# Patient Record
Sex: MALE | Race: BLACK OR AFRICAN AMERICAN | URBAN - METROPOLITAN AREA
[De-identification: names, ages, dates, MRNs, and addresses within clinical notes are randomized per-mention and may not be internally consistent; named-entity substitution may affect disease eponyms.]

---

## 2020-07-25 ENCOUNTER — TELEPHONE ENCOUNTER (OUTPATIENT)
Dept: URBAN - METROPOLITAN AREA CLINIC 13 | Facility: CLINIC | Age: 35
End: 2020-07-25

## 2020-07-25 RX ORDER — DICYCLOMINE HYDROCHLORIDE 20 MG/1
TAKE 1 CAPSULE AS NEEDED TABLET ORAL
Refills: 0 | OUTPATIENT
Start: 2010-06-08 | End: 2010-10-13

## 2020-07-25 RX ORDER — LORAZEPAM 2 MG/1
TAKE  TABLET  1/2 TABLET DAILY TABLET ORAL
Refills: 0 | OUTPATIENT
Start: 2009-02-02 | End: 2010-06-08

## 2020-07-25 RX ORDER — ESCITALOPRAM OXALATE 5 MG
TAKE 1 TABLET DAILY TABLET ORAL
Refills: 0 | OUTPATIENT
Start: 2009-02-02 | End: 2010-06-08

## 2020-07-26 ENCOUNTER — TELEPHONE ENCOUNTER (OUTPATIENT)
Dept: URBAN - METROPOLITAN AREA CLINIC 13 | Facility: CLINIC | Age: 35
End: 2020-07-26

## 2021-05-11 ENCOUNTER — TELEPHONE ENCOUNTER (OUTPATIENT)
Dept: URBAN - METROPOLITAN AREA CLINIC 72 | Facility: CLINIC | Age: 36
End: 2021-05-11

## 2021-06-01 ENCOUNTER — OFFICE VISIT (OUTPATIENT)
Dept: URBAN - METROPOLITAN AREA CLINIC 72 | Facility: CLINIC | Age: 36
End: 2021-06-01
Payer: SELF-PAY

## 2021-06-01 ENCOUNTER — WEB ENCOUNTER (OUTPATIENT)
Dept: URBAN - METROPOLITAN AREA CLINIC 72 | Facility: CLINIC | Age: 36
End: 2021-06-01

## 2021-06-01 VITALS
TEMPERATURE: 96.3 F | WEIGHT: 151 LBS | HEIGHT: 66 IN | BODY MASS INDEX: 24.27 KG/M2 | SYSTOLIC BLOOD PRESSURE: 121 MMHG | DIASTOLIC BLOOD PRESSURE: 86 MMHG | RESPIRATION RATE: 20 BRPM | HEART RATE: 76 BPM

## 2021-06-01 DIAGNOSIS — K21.9 GASTROESOPHAGEAL REFLUX DISEASE, UNSPECIFIED WHETHER ESOPHAGITIS PRESENT: ICD-10-CM

## 2021-06-01 DIAGNOSIS — K62.5 BRBPR (BRIGHT RED BLOOD PER RECTUM): ICD-10-CM

## 2021-06-01 PROCEDURE — 99204 OFFICE O/P NEW MOD 45 MIN: CPT | Performed by: INTERNAL MEDICINE

## 2021-06-01 RX ORDER — SUCRALFATE 1 G/1
AS DIRECTED TABLET ORAL ONCE DAILY
Status: ACTIVE | COMMUNITY

## 2021-06-01 RX ORDER — BENZTROPINE MESYLATE 0.5 MG/1
1 TABLET AT BEDTIME TABLET ORAL ONCE A DAY
Status: ACTIVE | COMMUNITY

## 2021-06-01 RX ORDER — PROPRANOLOL HYDROCHLORIDE 20 MG/1
1 TABLET TABLET ORAL ONCE A DAY
Status: ACTIVE | COMMUNITY

## 2021-06-01 RX ORDER — DULOXETINE 30 MG/1
1 CAPSULE CAPSULE, DELAYED RELEASE PELLETS ORAL ONCE A DAY
Status: ACTIVE | COMMUNITY

## 2021-06-01 RX ORDER — ARIPIPRAZOLE 10 MG/1
1 TABLET TABLET ORAL ONCE A DAY
Status: ACTIVE | COMMUNITY

## 2021-06-01 RX ORDER — ESOMEPRAZOLE MAGNESIUM 20 MG/1
1 CAPSULE CAPSULE, DELAYED RELEASE ORAL ONCE A DAY
Status: ACTIVE | COMMUNITY

## 2021-06-01 NOTE — HPI-TODAY'S VISIT:
Mister Barrett is a pleasant 36-year-old male who presents with a new patient for evaluation of abdominal discomfort. He was seen in the emergency department at [facility] on 5/18/2021 with a complaint of feeling as something was stuck in his esophagus, denied any true dysphagia.  He was treated with a GI cocktail which resulted issues.  he reports that he gets this pain quite often, it seems to come around at least once or twice a year.  He describes it as a pain in his left upper quadrant, is associated with burning in his midesophagus and occasional dysphagia.  He does take Nexium with some improvement overall however never completely resolved.  In addition he reports weight loss related to the pain as he has to cut back on what he eats due to discomfort.  He in addition endorses rectal bleeding, he states that he will have significant blood with a bowel movement that will then go away for a few days and then returned.  He has had hemorrhoids in the past but does not feel like it is related to that.  The pain and the bleeding can occur together can occur independently as well.  He has tried Metamucil for his bowels with some improvement overall.  He denies any overt constipation.  Chart review: He has been seen in our office before, last in 2010.  He initially presented to us with a complaint of abdominal pain and weight loss, GI workup including CT scan and lab workup CBC CMP and TSH as well as stool studies were unremarkable.  An EGD at that time showed erosive gastropathy and inflammation at the GE junction consistent with reflux, colonoscopy for hematochezia did reveal internal hemorrhoids.

## 2021-07-16 ENCOUNTER — OFFICE VISIT (OUTPATIENT)
Dept: URBAN - METROPOLITAN AREA MEDICAL CENTER 40 | Facility: MEDICAL CENTER | Age: 36
End: 2021-07-16

## 2021-07-27 ENCOUNTER — OFFICE VISIT (OUTPATIENT)
Dept: URBAN - METROPOLITAN AREA CLINIC 72 | Facility: CLINIC | Age: 36
End: 2021-07-27

## 2021-07-27 RX ORDER — DULOXETINE 30 MG/1
1 CAPSULE CAPSULE, DELAYED RELEASE PELLETS ORAL ONCE A DAY
Status: ACTIVE | COMMUNITY

## 2021-07-27 RX ORDER — ESOMEPRAZOLE MAGNESIUM 20 MG/1
1 CAPSULE CAPSULE, DELAYED RELEASE ORAL ONCE A DAY
Status: ACTIVE | COMMUNITY

## 2021-07-27 RX ORDER — PROPRANOLOL HYDROCHLORIDE 20 MG/1
1 TABLET TABLET ORAL ONCE A DAY
Status: ACTIVE | COMMUNITY

## 2021-07-27 RX ORDER — ARIPIPRAZOLE 10 MG/1
1 TABLET TABLET ORAL ONCE A DAY
Status: ACTIVE | COMMUNITY

## 2021-07-27 RX ORDER — SUCRALFATE 1 G/1
AS DIRECTED TABLET ORAL ONCE DAILY
Status: ACTIVE | COMMUNITY

## 2021-07-27 RX ORDER — BENZTROPINE MESYLATE 0.5 MG/1
1 TABLET AT BEDTIME TABLET ORAL ONCE A DAY
Status: ACTIVE | COMMUNITY

## 2021-07-27 NOTE — HPI-TODAY'S VISIT:
Mr. Barrett returns for follow-up.  He is a pleasant 36-year-old male last seen office on 6/1/2021.  He was referred to us from the ER after experiencing a foreign body sensation in esophagus that responded to GI cocktail.  He stated that resolved and he actually had occasional burning in his midesophagus and left upper quadrant pain.  He noted weight loss and decreased appetite as well as rectal bleeding.  He tried Metamucil for the rectal bleeding with minimal improvement.  He had similar complaints back in 2010 and had a complete workup including CT scan, blood work and EGD and colonoscopy.  EGD showed reflux and colonoscopy had internal hemorrhoids.  Due to his ongoing symptoms we decided to proceed again with bidirectional endoscopy her patient never had this performed.

## 2023-03-24 ENCOUNTER — CLAIMS CREATED FROM THE CLAIM WINDOW (OUTPATIENT)
Dept: URBAN - METROPOLITAN AREA CLINIC 72 | Facility: CLINIC | Age: 38
End: 2023-03-24
Payer: SELF-PAY

## 2023-03-24 ENCOUNTER — LAB OUTSIDE AN ENCOUNTER (OUTPATIENT)
Dept: URBAN - METROPOLITAN AREA CLINIC 72 | Facility: CLINIC | Age: 38
End: 2023-03-24

## 2023-03-24 VITALS
DIASTOLIC BLOOD PRESSURE: 78 MMHG | WEIGHT: 165 LBS | BODY MASS INDEX: 26.52 KG/M2 | SYSTOLIC BLOOD PRESSURE: 118 MMHG | HEART RATE: 81 BPM | HEIGHT: 66 IN

## 2023-03-24 DIAGNOSIS — R19.8 CHANGE IN BOWEL MOVEMENT: ICD-10-CM

## 2023-03-24 DIAGNOSIS — K21.00 GASTROESOPHAGEAL REFLUX DISEASE WITH ESOPHAGITIS WITHOUT HEMORRHAGE: ICD-10-CM

## 2023-03-24 DIAGNOSIS — K64.8 INTERNAL HEMORRHOIDS: ICD-10-CM

## 2023-03-24 DIAGNOSIS — R39.15 URINARY URGENCY: ICD-10-CM

## 2023-03-24 PROBLEM — 266433003: Status: ACTIVE | Noted: 2023-03-24

## 2023-03-24 PROCEDURE — 99214 OFFICE O/P EST MOD 30 MIN: CPT | Performed by: NURSE PRACTITIONER

## 2023-03-24 RX ORDER — DULOXETINE 30 MG/1
1 CAPSULE CAPSULE, DELAYED RELEASE PELLETS ORAL ONCE A DAY
Status: ACTIVE | COMMUNITY

## 2023-03-24 RX ORDER — SUCRALFATE 1 G/1
AS DIRECTED TABLET ORAL ONCE DAILY
Status: ON HOLD | COMMUNITY

## 2023-03-24 RX ORDER — ARIPIPRAZOLE 10 MG/1
1 TABLET TABLET ORAL ONCE A DAY
Status: ACTIVE | COMMUNITY

## 2023-03-24 RX ORDER — ESOMEPRAZOLE MAGNESIUM 20 MG/1
1 CAPSULE CAPSULE, DELAYED RELEASE ORAL ONCE A DAY
Status: ACTIVE | COMMUNITY

## 2023-03-24 RX ORDER — PROPRANOLOL HYDROCHLORIDE 20 MG/1
1 TABLET TABLET ORAL ONCE A DAY
Status: ACTIVE | COMMUNITY

## 2023-03-24 RX ORDER — BENZTROPINE MESYLATE 0.5 MG/1
1 TABLET AT BEDTIME TABLET ORAL ONCE A DAY
Status: ACTIVE | COMMUNITY

## 2023-03-24 NOTE — HPI-TODAY'S VISIT:
38-year-old male here for hemorrhoids.  Past medical history of anxiety, GERD, IBS and schizophrenia.  Was seen in the ER 3/22/2023 with pain when having bowel movement and trouble urinating.  UA negative.  Was prescribed docusate.  UA 3/22/2023.  Negative.  Had scrotal ultrasound 12/4/2022 4 mm left testicular cyst  Last seen 6/1/2021 for abdominal discomfort, GERD and bright red blood per rectum.  Colonoscopy and EGD ordered for further evaluation.  Procedures were canceled and he no showed for his follow-up appointment.  Last EGD/Colon 2009/2010 and had a complete workup including CT scan, blood work and EGD and colonoscopy.  EGD showed reflux and colonoscopy had internal hemorrhoids.  Pt [facility] due to flare up off hemorrhoids and feeling like he isnt fully emptying bladder. Was suppose to follow up with Gastro and Urology.   Pt has to see PCP for referral to urology has a appt. Monday. Pt states he has pain and bleeding with hemorrhoids. He has been using Preparation H suppositories for a week. He also has pain with bowels and while sitting down.  Pt was told at ER he had external and internal hemorrhoids. Was told by ER to just do fiber. Pt has had hemorrhoids for years never had removed.  BM daily loose.  Abdominal pain after BM.  No melena or hematochezia.   Intermittent GERD for years.  On esomeprazole daily.  No CP, SOB, palpitations, syncope, near-syncope or problems with anesthesia previously.

## 2023-03-24 NOTE — EXAM-PHYSICAL EXAM
Rectal/Anoscopy: Normal external exam.  Small internall hemorrhoid.  No bleeding present.  PAGE: Normal sphincter tone and rectal muscle upon bearing down. No tenderness on exam.  Chaperone present.

## 2023-03-27 ENCOUNTER — TELEPHONE ENCOUNTER (OUTPATIENT)
Dept: URBAN - METROPOLITAN AREA CLINIC 72 | Facility: CLINIC | Age: 38
End: 2023-03-27

## 2023-04-04 ENCOUNTER — TELEPHONE ENCOUNTER (OUTPATIENT)
Dept: URBAN - METROPOLITAN AREA CLINIC 72 | Facility: CLINIC | Age: 38
End: 2023-04-04

## 2023-04-06 ENCOUNTER — TELEPHONE ENCOUNTER (OUTPATIENT)
Dept: URBAN - METROPOLITAN AREA CLINIC 72 | Facility: CLINIC | Age: 38
End: 2023-04-06

## 2023-04-14 ENCOUNTER — OFFICE VISIT (OUTPATIENT)
Dept: URBAN - METROPOLITAN AREA MEDICAL CENTER 40 | Facility: MEDICAL CENTER | Age: 38
End: 2023-04-14

## 2023-04-19 ENCOUNTER — TELEPHONE ENCOUNTER (OUTPATIENT)
Dept: URBAN - METROPOLITAN AREA CLINIC 92 | Facility: CLINIC | Age: 38
End: 2023-04-19

## 2023-04-24 ENCOUNTER — OFFICE VISIT (OUTPATIENT)
Dept: URBAN - METROPOLITAN AREA MEDICAL CENTER 40 | Facility: MEDICAL CENTER | Age: 38
End: 2023-04-24

## 2023-05-04 ENCOUNTER — OFFICE VISIT (OUTPATIENT)
Dept: URBAN - METROPOLITAN AREA MEDICAL CENTER 40 | Facility: MEDICAL CENTER | Age: 38
End: 2023-05-04

## 2023-05-19 ENCOUNTER — CLAIMS CREATED FROM THE CLAIM WINDOW (OUTPATIENT)
Dept: URBAN - METROPOLITAN AREA MEDICAL CENTER 40 | Facility: MEDICAL CENTER | Age: 38
End: 2023-05-19
Payer: SELF-PAY

## 2023-05-19 DIAGNOSIS — K31.89 REACTIVE GASTROPATHY: ICD-10-CM

## 2023-05-19 DIAGNOSIS — K22.10 EROSIVE ESOPHAGITIS: ICD-10-CM

## 2023-05-19 DIAGNOSIS — K21.9 GASTROESOPHAGEAL REFLUX DISEASE, UNSPECIFIED WHETHER ESOPHAGITIS PRESENT: ICD-10-CM

## 2023-05-19 PROCEDURE — 43239 EGD BIOPSY SINGLE/MULTIPLE: CPT | Performed by: INTERNAL MEDICINE

## 2023-05-22 ENCOUNTER — TELEPHONE ENCOUNTER (OUTPATIENT)
Dept: URBAN - METROPOLITAN AREA CLINIC 72 | Facility: CLINIC | Age: 38
End: 2023-05-22

## 2023-05-31 PROBLEM — 235595009: Status: ACTIVE | Noted: 2023-05-31

## 2023-06-27 ENCOUNTER — TELEPHONE ENCOUNTER (OUTPATIENT)
Dept: URBAN - METROPOLITAN AREA CLINIC 72 | Facility: CLINIC | Age: 38
End: 2023-06-27

## 2023-06-28 ENCOUNTER — TELEPHONE ENCOUNTER (OUTPATIENT)
Dept: URBAN - METROPOLITAN AREA CLINIC 72 | Facility: CLINIC | Age: 38
End: 2023-06-28

## 2023-06-30 ENCOUNTER — OFFICE VISIT (OUTPATIENT)
Dept: URBAN - METROPOLITAN AREA MEDICAL CENTER 40 | Facility: MEDICAL CENTER | Age: 38
End: 2023-06-30

## 2023-07-05 ENCOUNTER — TELEPHONE ENCOUNTER (OUTPATIENT)
Dept: URBAN - METROPOLITAN AREA CLINIC 72 | Facility: CLINIC | Age: 38
End: 2023-07-05

## 2023-07-06 ENCOUNTER — TELEPHONE ENCOUNTER (OUTPATIENT)
Dept: URBAN - METROPOLITAN AREA CLINIC 6 | Facility: CLINIC | Age: 38
End: 2023-07-06

## 2023-07-14 ENCOUNTER — OFFICE VISIT (OUTPATIENT)
Dept: URBAN - METROPOLITAN AREA MEDICAL CENTER 40 | Facility: MEDICAL CENTER | Age: 38
End: 2023-07-14
Payer: SELF-PAY

## 2023-07-14 DIAGNOSIS — R19.4 ALTERED BOWEL FUNCTION: ICD-10-CM

## 2023-07-14 DIAGNOSIS — K64.0 GRADE I HEMORRHOIDS: ICD-10-CM

## 2023-07-14 DIAGNOSIS — K62.89 RECTAL PAIN: ICD-10-CM

## 2023-07-14 DIAGNOSIS — K60.2 ANAL FISSURE: ICD-10-CM

## 2023-07-14 PROCEDURE — 45380 COLONOSCOPY AND BIOPSY: CPT | Performed by: INTERNAL MEDICINE

## 2023-07-26 ENCOUNTER — TELEPHONE ENCOUNTER (OUTPATIENT)
Dept: URBAN - METROPOLITAN AREA CLINIC 72 | Facility: CLINIC | Age: 38
End: 2023-07-26

## 2023-08-01 ENCOUNTER — OFFICE VISIT (OUTPATIENT)
Dept: URBAN - METROPOLITAN AREA CLINIC 72 | Facility: CLINIC | Age: 38
End: 2023-08-01

## 2023-09-05 ENCOUNTER — OFFICE VISIT (OUTPATIENT)
Dept: URBAN - METROPOLITAN AREA CLINIC 72 | Facility: CLINIC | Age: 38
End: 2023-09-05

## 2023-09-05 RX ORDER — ESOMEPRAZOLE MAGNESIUM 20 MG/1
1 CAPSULE CAPSULE, DELAYED RELEASE ORAL ONCE A DAY
Status: ACTIVE | COMMUNITY

## 2023-09-05 RX ORDER — DULOXETINE 30 MG/1
1 CAPSULE CAPSULE, DELAYED RELEASE PELLETS ORAL ONCE A DAY
Status: ACTIVE | COMMUNITY

## 2023-09-05 RX ORDER — PROPRANOLOL HYDROCHLORIDE 20 MG/1
1 TABLET TABLET ORAL ONCE A DAY
Status: ACTIVE | COMMUNITY

## 2023-09-05 RX ORDER — SUCRALFATE 1 G/1
AS DIRECTED TABLET ORAL ONCE DAILY
Status: ON HOLD | COMMUNITY

## 2023-09-05 RX ORDER — BENZTROPINE MESYLATE 0.5 MG/1
1 TABLET AT BEDTIME TABLET ORAL ONCE A DAY
Status: ACTIVE | COMMUNITY

## 2023-09-05 RX ORDER — ARIPIPRAZOLE 10 MG/1
1 TABLET TABLET ORAL ONCE A DAY
Status: ACTIVE | COMMUNITY

## 2023-09-05 NOTE — HPI-TODAY'S VISIT:
38-year-old male here for colonoscopy follow-up.    Past medical history of anxiety, GERD, IBS and schizophrenia.  Colonoscopy 7/14/2023 field anal fissure mild severity in posterior anal canal.  Internal hemorrhoids mild nonthrombosed nonbleeding.  Colonoscopy otherwise normal.  Random colon biopsies revealed scattered lymphoid aggregates otherwise normal. EGD 5/19/2023 was normal.  Esophageal biopsy revealed erosive esophagitis.  Normal stomach and duodenal biopsy.  PPI therapy recommended patient on Nexium.  Was seen in the ER 3/22/2023 with pain when having bowel movement and trouble urinating.  UA negative.  Was prescribed docusate.  UA 3/22/2023.  Negative.  Had scrotal ultrasound 12/4/2022 4 mm left testicular cyst  Last note:  Pt went to [facility] yesterday due to flare up off hemorrhoids and feeling like he isnt fully emptying bladder. Was suppose to follow up with Gastro and Urology.   Pt has to see PCP for referral to urology has a appt. Monday. Pt states he has pain and bleeding with hemorrhoids. He has been using Preparation H suppositories for a week. He also has pain with bowels and while sitting down.  Pt was told at ER he had external and internal hemorrhoids. Was told by ER to just do fiber. Pt has had hemorrhoids for years never had removed.  BM daily loose.  Abdominal pain after BM.  No melena or hematochezia.   Intermittent GERD for years.  On esomeprazole daily.  No CP, SOB, palpitations, syncope, near-syncope or problems with anesthesia previously.

## 2023-10-03 ENCOUNTER — OFFICE VISIT (OUTPATIENT)
Dept: URBAN - METROPOLITAN AREA CLINIC 72 | Facility: CLINIC | Age: 38
End: 2023-10-03

## 2023-10-05 ENCOUNTER — OFFICE VISIT (OUTPATIENT)
Dept: URBAN - METROPOLITAN AREA CLINIC 72 | Facility: CLINIC | Age: 38
End: 2023-10-05
Payer: SELF-PAY

## 2023-10-05 ENCOUNTER — ERX REFILL RESPONSE (OUTPATIENT)
Dept: URBAN - METROPOLITAN AREA CLINIC 72 | Facility: CLINIC | Age: 38
End: 2023-10-05

## 2023-10-05 ENCOUNTER — DASHBOARD ENCOUNTERS (OUTPATIENT)
Age: 38
End: 2023-10-05

## 2023-10-05 VITALS
BODY MASS INDEX: 26.48 KG/M2 | HEART RATE: 80 BPM | SYSTOLIC BLOOD PRESSURE: 107 MMHG | TEMPERATURE: 97.3 F | HEIGHT: 66 IN | WEIGHT: 164.8 LBS | DIASTOLIC BLOOD PRESSURE: 77 MMHG

## 2023-10-05 DIAGNOSIS — K21.00 GASTROESOPHAGEAL REFLUX DISEASE WITH ESOPHAGITIS WITHOUT HEMORRHAGE: ICD-10-CM

## 2023-10-05 DIAGNOSIS — K64.8 INTERNAL HEMORRHOIDS: ICD-10-CM

## 2023-10-05 DIAGNOSIS — R10.12 LEFT UPPER QUADRANT ABDOMINAL PAIN: ICD-10-CM

## 2023-10-05 PROCEDURE — 99214 OFFICE O/P EST MOD 30 MIN: CPT | Performed by: NURSE PRACTITIONER

## 2023-10-05 RX ORDER — DULOXETINE 30 MG/1
1 CAPSULE CAPSULE, DELAYED RELEASE PELLETS ORAL ONCE A DAY
Status: ACTIVE | COMMUNITY

## 2023-10-05 RX ORDER — CIPROFLOXACIN HYDROCHLORIDE 500 MG/1
TAKE 1 TABLET BY MOUTH EVERY 12 HOURS FOR 14 DAYS TABLET, FILM COATED ORAL
Qty: 28 EACH | Refills: 0 | Status: ACTIVE | COMMUNITY

## 2023-10-05 RX ORDER — ESOMEPRAZOLE MAGNESIUM 20 MG/1
1 CAPSULE CAPSULE, DELAYED RELEASE ORAL ONCE A DAY
Status: ACTIVE | COMMUNITY

## 2023-10-05 RX ORDER — PANTOPRAZOLE SODIUM 40 MG/1
1 TABLET 30 MINUTES BEFORE BREAKFAST ON AN EMPTY STOMACH TABLET, DELAYED RELEASE ORAL ONCE A DAY
Qty: 30 | Refills: 1 | OUTPATIENT

## 2023-10-05 RX ORDER — BENZTROPINE MESYLATE 0.5 MG/1
1 TABLET AT BEDTIME TABLET ORAL ONCE A DAY
Status: ACTIVE | COMMUNITY

## 2023-10-05 RX ORDER — SUCRALFATE 1 G/1
AS DIRECTED TABLET ORAL ONCE DAILY
Status: ON HOLD | COMMUNITY

## 2023-10-05 RX ORDER — ARIPIPRAZOLE 10 MG/1
1 TABLET TABLET ORAL ONCE A DAY
Status: ACTIVE | COMMUNITY

## 2023-10-05 RX ORDER — ESOMEPRAZOLE MAGNESIUM 40 MG/1
1 CAPSULE 30 MINUTES BEFORE BREAKFAST ON AN EMPTY STOMACH CAPSULE, DELAYED RELEASE ORAL ONCE A DAY
Qty: 30 | Refills: 1 | OUTPATIENT
Start: 2023-10-05

## 2023-10-05 RX ORDER — SUCRALFATE 1 G/1
1 TABLET ON AN EMPTY STOMACH TABLET ORAL
Qty: 60 | Refills: 2 | OUTPATIENT
Start: 2023-10-05 | End: 2024-01-02

## 2023-10-05 RX ORDER — PROPRANOLOL HYDROCHLORIDE 20 MG/1
1 TABLET TABLET ORAL ONCE A DAY
Status: ACTIVE | COMMUNITY

## 2023-10-05 RX ORDER — ESOMEPRAZOLE MAGNESIUM 40 MG/1
1 CAPSULE 30 MINUTES BEFORE BREAKFAST ON AN EMPTY STOMACH CAPSULE, DELAYED RELEASE ORAL ONCE A DAY
Qty: 30 | Refills: 1 | OUTPATIENT

## 2023-10-05 NOTE — HPI-TODAY'S VISIT:
38-year-old male here for an ER follow-up.    Past medical history of anxiety, GERD, IBS and schizophrenia.  To the ER 9/30/2023 for left-sided abdominal pain after taking ibuprofen.  Previously diagnosed with UTI.  Had CT scan at outside hospital 1 week ago was told he had a history of gastritis.  CT scan did not show any acute findings.  WBC and Hgb normal.  UA negative.  Discharged home.  Omeprazole was increased from 20 to 40 mg daily.  He continues to report some intermittent left upper quadrant pain.  He denies any current NSAID use. He was told by the urologist that he had epididymitis and now states he is told his prostate is enlarged.  Urologist put him back on Cipro.  He sees his urologist next week.  He is still on his esomeprazole 20 mg daily, has not increased the dose.  Still has stomach irritation when he eats.  No nausea, vomiting or dysphagia.  Occasional rectal discomfort, uses cream as needed. Bowels are moving normally no melena or hematochezia.

## 2023-10-05 NOTE — HPI-TODAY'S VISIT:
Procedures: Colonoscopy 7/14/2023 revealed anal fissure mild severity in posterior anal canal.  Internal hemorrhoids mild nonthrombosed nonbleeding.  Colonoscopy otherwise normal.  Random colon biopsies revealed scattered lymphoid aggregates otherwise normal. EGD 5/19/2023 was normal.  Esophageal biopsy revealed erosive esophagitis.  Normal stomach and duodenal biopsy.  PPI therapy recommended patient on Nexium.  Imaging: CT abdomen and pelvis with contrast 9/30/2023 no acute findings.  Mesenteric adhesions compatible with chronic sequela of prior abdominal surgery versus prior acute or chronic inflammatory insult.  Mild enlarged prostate.  Fatty deposition of liver near the falciform ligament.  Labs: 9/30/2023.  CBC normal with Hgb 14.1.  CMP normal.  Lipase normal.

## 2023-12-08 ENCOUNTER — OFFICE VISIT (OUTPATIENT)
Dept: URBAN - METROPOLITAN AREA CLINIC 72 | Facility: CLINIC | Age: 38
End: 2023-12-08

## 2023-12-08 RX ORDER — SUCRALFATE 1 G/1
1 TABLET ON AN EMPTY STOMACH TABLET ORAL
Qty: 60 | Refills: 2 | Status: ACTIVE | COMMUNITY
Start: 2023-10-05 | End: 2024-01-02

## 2023-12-08 RX ORDER — ARIPIPRAZOLE 10 MG/1
1 TABLET TABLET ORAL ONCE A DAY
Status: ACTIVE | COMMUNITY

## 2023-12-08 RX ORDER — ESOMEPRAZOLE MAGNESIUM 20 MG/1
1 CAPSULE CAPSULE, DELAYED RELEASE ORAL ONCE A DAY
Status: ACTIVE | COMMUNITY

## 2023-12-08 RX ORDER — PROPRANOLOL HYDROCHLORIDE 20 MG/1
1 TABLET TABLET ORAL ONCE A DAY
Status: ACTIVE | COMMUNITY

## 2023-12-08 RX ORDER — SUCRALFATE 1 G/1
AS DIRECTED TABLET ORAL ONCE DAILY
Status: ON HOLD | COMMUNITY

## 2023-12-08 RX ORDER — PANTOPRAZOLE SODIUM 40 MG/1
1 TABLET 30 MINUTES BEFORE BREAKFAST ON AN EMPTY STOMACH TABLET, DELAYED RELEASE ORAL ONCE A DAY
Qty: 30 | Refills: 1 | Status: ACTIVE | COMMUNITY

## 2023-12-08 RX ORDER — BENZTROPINE MESYLATE 0.5 MG/1
1 TABLET AT BEDTIME TABLET ORAL ONCE A DAY
Status: ACTIVE | COMMUNITY

## 2023-12-08 RX ORDER — DULOXETINE 30 MG/1
1 CAPSULE CAPSULE, DELAYED RELEASE PELLETS ORAL ONCE A DAY
Status: ACTIVE | COMMUNITY

## 2023-12-08 RX ORDER — CIPROFLOXACIN HYDROCHLORIDE 500 MG/1
TAKE 1 TABLET BY MOUTH EVERY 12 HOURS FOR 14 DAYS TABLET, FILM COATED ORAL
Qty: 28 EACH | Refills: 0 | Status: ACTIVE | COMMUNITY

## 2023-12-08 NOTE — HPI-TODAY'S VISIT:
38-year-old male here for a 2 month follow-up.    Last seen 10/5/2023 for colonoscopy follow-up.  Was experiencing some left upper quadrant abdominal pain and GERD.  Started on as omeprazole daily and Carafate.  Had EGD earlier in the year which revealed erosive esophagitis.  Recent CT showed no acute findings.  Mesenteric adhesions present.  Past medical history of anxiety, GERD, IBS and schizophrenia.  Last note:  He continues to report some intermittent left upper quadrant pain.  He denies any current NSAID use. He was told by the urologist that he had epididymitis and now states he is told his prostate is enlarged.  Urologist put him back on Cipro.  He sees his urologist next week.  He is still on his esomeprazole 20 mg daily, has not increased the dose.  Still has stomach irritation when he eats.  No nausea, vomiting or dysphagia.  Occasional rectal discomfort, uses cream as needed. Bowels are moving normally no melena or hematochezia.

## 2024-02-14 ENCOUNTER — OV EP (OUTPATIENT)
Dept: URBAN - METROPOLITAN AREA CLINIC 72 | Facility: CLINIC | Age: 39
End: 2024-02-14

## 2024-03-22 ENCOUNTER — OV EP (OUTPATIENT)
Dept: URBAN - METROPOLITAN AREA CLINIC 72 | Facility: CLINIC | Age: 39
End: 2024-03-22

## 2024-11-21 ENCOUNTER — OFFICE VISIT (OUTPATIENT)
Dept: URBAN - METROPOLITAN AREA CLINIC 72 | Facility: CLINIC | Age: 39
End: 2024-11-21

## 2025-05-01 ENCOUNTER — P2P PATIENT RECORD (OUTPATIENT)
Age: 40
End: 2025-05-01

## 2025-05-04 ENCOUNTER — P2P PATIENT RECORD (OUTPATIENT)
Age: 40
End: 2025-05-04

## 2025-05-05 ENCOUNTER — LAB OUTSIDE AN ENCOUNTER (OUTPATIENT)
Dept: URBAN - METROPOLITAN AREA CLINIC 72 | Facility: CLINIC | Age: 40
End: 2025-05-05

## 2025-05-05 ENCOUNTER — OFFICE VISIT (OUTPATIENT)
Dept: URBAN - METROPOLITAN AREA CLINIC 72 | Facility: CLINIC | Age: 40
End: 2025-05-05
Payer: COMMERCIAL

## 2025-05-05 DIAGNOSIS — K62.89 RECTAL PAIN: ICD-10-CM

## 2025-05-05 DIAGNOSIS — K64.8 INTERNAL HEMORRHOIDS: ICD-10-CM

## 2025-05-05 DIAGNOSIS — K62.5 RECTAL BLEEDING: ICD-10-CM

## 2025-05-05 PROBLEM — 77880009: Status: ACTIVE | Noted: 2025-05-05

## 2025-05-05 PROBLEM — 90458007: Status: ACTIVE | Noted: 2025-05-05

## 2025-05-05 PROBLEM — 12063002: Status: ACTIVE | Noted: 2025-05-05

## 2025-05-05 PROCEDURE — 99214 OFFICE O/P EST MOD 30 MIN: CPT | Performed by: INTERNAL MEDICINE

## 2025-05-05 RX ORDER — BENZTROPINE MESYLATE 0.5 MG/1
1 TABLET AT BEDTIME TABLET ORAL ONCE A DAY
Status: ACTIVE | COMMUNITY

## 2025-05-05 RX ORDER — OMEPRAZOLE 10 MG/1
1 CAPSULE 1/2 TO 1 HOUR BEFORE MORNING MEAL CAPSULE, DELAYED RELEASE ORAL ONCE A DAY
Status: ACTIVE | COMMUNITY

## 2025-05-05 RX ORDER — DULOXETINE 30 MG/1
1 CAPSULE CAPSULE, DELAYED RELEASE PELLETS ORAL ONCE A DAY
Status: ACTIVE | COMMUNITY

## 2025-05-05 RX ORDER — CIPROFLOXACIN HYDROCHLORIDE 500 MG/1
TAKE 1 TABLET BY MOUTH EVERY 12 HOURS FOR 14 DAYS TABLET, FILM COATED ORAL
Qty: 28 EACH | Refills: 0 | Status: ACTIVE | COMMUNITY

## 2025-05-05 RX ORDER — ARIPIPRAZOLE 10 MG/1
1 TABLET TABLET ORAL ONCE A DAY
Status: ACTIVE | COMMUNITY

## 2025-05-05 RX ORDER — HYDROCORTISONE ACETATE 25 MG/1
1 SUPPOSITORY SUPPOSITORY RECTAL ONCE A DAY
Status: ACTIVE | COMMUNITY

## 2025-05-05 RX ORDER — PROPRANOLOL HYDROCHLORIDE 20 MG/1
1 TABLET TABLET ORAL ONCE A DAY
Status: ACTIVE | COMMUNITY

## 2025-05-05 RX ORDER — SUCRALFATE 1 G/1
AS DIRECTED TABLET ORAL ONCE DAILY
Status: ON HOLD | COMMUNITY

## 2025-05-05 NOTE — HPI-TODAY'S VISIT:
Patient is a 40-year-old male last seen in the office on 10/5/2023 for left upper quadrant pain, GERD, internal hemorrhoids/anal fissure.  He is here today for hospital follow-up.  Patient was seen at [facility] on 4/30/2025 for rectal bleeding/pain.  Patient was discharged with a diagnosis of internal hemorrhoids.  Patient states that he was given Anusol and told he should be doing Sitx baths. He is doing that and the bleeding has gotten considerably better. Prior to the ER - he was trying to put in a Prep H supp and he felt a sharp pain and then he started bleeding. CT  on 4/30/25 was normal. He takes fiber BID. He hasnt had a flare of hemorrhoids or his fissure sincxe 2023. He doesnt recall having a hard stool or straining, but he had been exercising some.

## 2025-05-05 NOTE — HPI-OTHER HISTORIES
DI: 4/30/2025-CT abdomen and pelvis with contrast: No acute abdominal pelvic findings.  Labs: 4/30/2025-, K4.3, chloride 104, BUN 10, creatinine 0.98, ALP 60, ALT 22, AST 26, total bili 0.9, glucose 117, WBC 5.8, RBC 4.91, hemoglobin 14.9, MCV 89, platelets 202  Procedures: 7/14/2023-colonoscopy: Anal fissure was noted of mild severity and at the posterior anal canal.  Internal hemorrhoids were identified.  Severity is described as mild, nonthrombosed, nonbleeding.  Random colon biopsies done to rule out colitis.  Path: Random biopsies normal.  Repeat colonoscopy in 10 years.  Due 7/2033.  5/19/2023-EGD: Normal esophagus.  Squamocolumnar junction appeared regular and was located 39 cm from incisors.  Normal stomach.  Normal duodenum.  Path: Stomach biopsy showed mild reactive gastropathy.  Negative for H. pylori.  Esophageal biopsy showed gastric type, mucosa and squamous mucosa with erosive esophagitis.  Negative for intestinal metaplasia and dysplasia.

## 2025-05-20 ENCOUNTER — OFFICE VISIT (OUTPATIENT)
Dept: URBAN - METROPOLITAN AREA CLINIC 72 | Facility: CLINIC | Age: 40
End: 2025-05-20

## 2025-07-02 ENCOUNTER — OFFICE VISIT (OUTPATIENT)
Dept: URBAN - METROPOLITAN AREA CLINIC 72 | Facility: CLINIC | Age: 40
End: 2025-07-02

## 2025-07-02 RX ORDER — SUCRALFATE 1 G/1
AS DIRECTED TABLET ORAL ONCE DAILY
Status: ON HOLD | COMMUNITY

## 2025-07-02 RX ORDER — HYDROCORTISONE ACETATE 25 MG/1
1 SUPPOSITORY SUPPOSITORY RECTAL ONCE A DAY
Status: ACTIVE | COMMUNITY

## 2025-07-02 RX ORDER — ARIPIPRAZOLE 10 MG/1
1 TABLET TABLET ORAL ONCE A DAY
Status: ACTIVE | COMMUNITY

## 2025-07-02 RX ORDER — PROPRANOLOL HYDROCHLORIDE 20 MG/1
1 TABLET TABLET ORAL ONCE A DAY
Status: ACTIVE | COMMUNITY

## 2025-07-02 RX ORDER — DULOXETINE 30 MG/1
1 CAPSULE CAPSULE, DELAYED RELEASE PELLETS ORAL ONCE A DAY
Status: ACTIVE | COMMUNITY

## 2025-07-02 RX ORDER — CIPROFLOXACIN HYDROCHLORIDE 500 MG/1
TAKE 1 TABLET BY MOUTH EVERY 12 HOURS FOR 14 DAYS TABLET, FILM COATED ORAL
Qty: 28 EACH | Refills: 0 | Status: ACTIVE | COMMUNITY

## 2025-07-02 RX ORDER — BENZTROPINE MESYLATE 0.5 MG/1
1 TABLET AT BEDTIME TABLET ORAL ONCE A DAY
Status: ACTIVE | COMMUNITY

## 2025-07-02 RX ORDER — OMEPRAZOLE 10 MG/1
1 CAPSULE 1/2 TO 1 HOUR BEFORE MORNING MEAL CAPSULE, DELAYED RELEASE ORAL ONCE A DAY
Status: ACTIVE | COMMUNITY

## 2025-07-02 NOTE — HPI-TODAY'S VISIT:
Mr. Barrett is a 40-year-old male who presents for evaluation for potential hemorrhoid banding.  He has been struggling with rectal bleeding and rectal pain.  Colonoscopy 2023 showed internal hemorrhoids and a small posterior anal canal fissure.  No polyps due for repeat colonoscopy 2033.

## 2025-08-20 ENCOUNTER — OFFICE VISIT (OUTPATIENT)
Dept: URBAN - METROPOLITAN AREA CLINIC 72 | Facility: CLINIC | Age: 40
End: 2025-08-20